# Patient Record
Sex: MALE | Race: WHITE | NOT HISPANIC OR LATINO | Employment: FULL TIME | URBAN - METROPOLITAN AREA
[De-identification: names, ages, dates, MRNs, and addresses within clinical notes are randomized per-mention and may not be internally consistent; named-entity substitution may affect disease eponyms.]

---

## 2023-08-09 ENCOUNTER — HOSPITAL ENCOUNTER (OUTPATIENT)
Dept: DATA CONVERSION | Facility: HOSPITAL | Age: 53
Discharge: HOME | End: 2023-08-09

## 2023-08-09 DIAGNOSIS — L03.116 CELLULITIS OF LEFT LOWER LIMB: ICD-10-CM

## 2023-08-09 DIAGNOSIS — F17.210 NICOTINE DEPENDENCE, CIGARETTES, UNCOMPLICATED: ICD-10-CM

## 2023-08-09 DIAGNOSIS — L53.9 ERYTHEMATOUS CONDITION, UNSPECIFIED: ICD-10-CM

## 2023-08-09 DIAGNOSIS — Z88.0 ALLERGY STATUS TO PENICILLIN: ICD-10-CM

## 2023-08-09 LAB
ALBUMIN SERPL-MCNC: 3.8 GM/DL (ref 3.5–5)
ALBUMIN/GLOB SERPL: 0.8 RATIO (ref 1.5–3)
ALP BLD-CCNC: 81 U/L (ref 35–125)
ALT SERPL-CCNC: 24 U/L (ref 5–40)
ALT SERPL-CCNC: ABNORMAL U/L (ref 5–40)
ANION GAP SERPL CALCULATED.3IONS-SCNC: 15 MMOL/L (ref 0–19)
AST SERPL-CCNC: 24 U/L (ref 5–40)
AST SERPL-CCNC: ABNORMAL U/L (ref 5–40)
BASOPHILS # BLD AUTO: 0.09 K/UL (ref 0–0.22)
BASOPHILS NFR BLD AUTO: 0.8 % (ref 0–1)
BILIRUB SERPL-MCNC: 0.8 MG/DL (ref 0.1–1.2)
BUN SERPL-MCNC: 14 MG/DL (ref 8–25)
BUN/CREAT SERPL: 10.8 RATIO (ref 8–21)
CALCIUM SERPL-MCNC: 9.3 MG/DL (ref 8.5–10.4)
CHLORIDE SERPL-SCNC: 99 MMOL/L (ref 97–107)
CO2 SERPL-SCNC: 20 MMOL/L (ref 24–31)
CREAT SERPL-MCNC: 1.3 MG/DL (ref 0.4–1.6)
DEPRECATED RDW RBC AUTO: 43.6 FL (ref 37–54)
DIFFERENTIAL METHOD BLD: ABNORMAL
EOSINOPHIL # BLD AUTO: 0.11 K/UL (ref 0–0.45)
EOSINOPHIL NFR BLD: 0.9 % (ref 0–3)
ERYTHROCYTE [DISTWIDTH] IN BLOOD BY AUTOMATED COUNT: 13.1 % (ref 11.7–15)
GFR SERPL CREATININE-BSD FRML MDRD: 65 ML/MIN/1.73 M2
GLOBULIN SER-MCNC: 4.5 G/DL (ref 1.9–3.7)
GLUCOSE SERPL-MCNC: 106 MG/DL (ref 65–99)
HCT VFR BLD AUTO: 49.8 % (ref 41–50)
HGB BLD-MCNC: 17.4 GM/DL (ref 13.5–16.5)
IMM GRANULOCYTES # BLD AUTO: 0.11 K/UL (ref 0–0.1)
LYMPHOCYTES # BLD AUTO: 1.05 K/UL (ref 1.2–3.2)
LYMPHOCYTES NFR BLD MANUAL: 8.8 % (ref 20–40)
MCH RBC QN AUTO: 31.7 PG (ref 26–34)
MCHC RBC AUTO-ENTMCNC: 34.9 % (ref 31–37)
MCV RBC AUTO: 90.7 FL (ref 80–100)
MONOCYTES # BLD AUTO: 1.04 K/UL (ref 0–0.8)
MONOCYTES NFR BLD MANUAL: 8.7 % (ref 0–8)
NEUTROPHILS # BLD AUTO: 9.58 K/UL
NEUTROPHILS # BLD AUTO: 9.58 K/UL (ref 1.8–7.7)
NEUTROPHILS.IMMATURE NFR BLD: 0.9 % (ref 0–1)
NEUTS SEG NFR BLD: 79.9 % (ref 50–70)
NRBC BLD-RTO: 0 /100 WBC
PLATELET # BLD AUTO: 196 K/UL (ref 150–450)
PMV BLD AUTO: 9.3 CU (ref 7–12.6)
POTASSIUM SERPL-SCNC: 3.9 MMOL/L (ref 3.4–5.1)
POTASSIUM SERPL-SCNC: ABNORMAL MMOL/L (ref 3.4–5.1)
PROT SERPL-MCNC: 8.3 G/DL (ref 5.9–7.9)
RBC # BLD AUTO: 5.49 M/UL (ref 4.5–5.5)
SODIUM SERPL-SCNC: 134 MMOL/L (ref 133–145)
WBC # BLD AUTO: 12 K/UL (ref 4.5–11)

## 2024-01-04 DIAGNOSIS — E78.49 OTHER HYPERLIPIDEMIA: Primary | ICD-10-CM

## 2024-01-04 RX ORDER — ATORVASTATIN CALCIUM 40 MG/1
40 TABLET, FILM COATED ORAL DAILY
Qty: 90 TABLET | Refills: 4 | Status: SHIPPED | OUTPATIENT
Start: 2024-01-04 | End: 2024-06-06 | Stop reason: SDUPTHER

## 2024-06-06 ENCOUNTER — OFFICE VISIT (OUTPATIENT)
Dept: PRIMARY CARE | Facility: CLINIC | Age: 54
End: 2024-06-06
Payer: COMMERCIAL

## 2024-06-06 VITALS
SYSTOLIC BLOOD PRESSURE: 144 MMHG | HEIGHT: 72 IN | BODY MASS INDEX: 42.66 KG/M2 | WEIGHT: 315 LBS | HEART RATE: 98 BPM | OXYGEN SATURATION: 98 % | DIASTOLIC BLOOD PRESSURE: 90 MMHG | TEMPERATURE: 97.9 F

## 2024-06-06 DIAGNOSIS — L03.116 CELLULITIS OF LEFT LOWER EXTREMITY: Primary | ICD-10-CM

## 2024-06-06 DIAGNOSIS — E78.49 OTHER HYPERLIPIDEMIA: ICD-10-CM

## 2024-06-06 PROCEDURE — 99214 OFFICE O/P EST MOD 30 MIN: CPT | Performed by: REGISTERED NURSE

## 2024-06-06 PROCEDURE — 4004F PT TOBACCO SCREEN RCVD TLK: CPT | Performed by: REGISTERED NURSE

## 2024-06-06 RX ORDER — SULFAMETHOXAZOLE AND TRIMETHOPRIM 800; 160 MG/1; MG/1
1 TABLET ORAL 2 TIMES DAILY
Qty: 14 TABLET | Refills: 0 | Status: SHIPPED | OUTPATIENT
Start: 2024-06-06 | End: 2024-06-13

## 2024-06-06 RX ORDER — ATORVASTATIN CALCIUM 40 MG/1
40 TABLET, FILM COATED ORAL DAILY
Qty: 90 TABLET | Refills: 3 | Status: SHIPPED | OUTPATIENT
Start: 2024-06-06 | End: 2024-09-04

## 2024-06-06 RX ORDER — CLOBETASOL PROPIONATE 0.5 MG/G
1 CREAM TOPICAL EVERY 12 HOURS
Qty: 60 G | Refills: 0 | Status: SHIPPED | OUTPATIENT
Start: 2024-06-06 | End: 2024-06-20

## 2024-06-06 RX ORDER — CLOBETASOL PROPIONATE 0.5 MG/G
1 CREAM TOPICAL EVERY 12 HOURS
COMMUNITY
End: 2024-06-06 | Stop reason: SDUPTHER

## 2024-06-06 ASSESSMENT — COLUMBIA-SUICIDE SEVERITY RATING SCALE - C-SSRS
2. HAVE YOU ACTUALLY HAD ANY THOUGHTS OF KILLING YOURSELF?: NO
1. IN THE PAST MONTH, HAVE YOU WISHED YOU WERE DEAD OR WISHED YOU COULD GO TO SLEEP AND NOT WAKE UP?: NO
6. HAVE YOU EVER DONE ANYTHING, STARTED TO DO ANYTHING, OR PREPARED TO DO ANYTHING TO END YOUR LIFE?: NO

## 2024-06-06 ASSESSMENT — PATIENT HEALTH QUESTIONNAIRE - PHQ9
2. FEELING DOWN, DEPRESSED OR HOPELESS: NOT AT ALL
1. LITTLE INTEREST OR PLEASURE IN DOING THINGS: NOT AT ALL
SUM OF ALL RESPONSES TO PHQ9 QUESTIONS 1 AND 2: 0

## 2024-06-06 ASSESSMENT — ENCOUNTER SYMPTOMS
WOUND: 1
DEPRESSION: 0
COLOR CHANGE: 1
OCCASIONAL FEELINGS OF UNSTEADINESS: 0
LOSS OF SENSATION IN FEET: 0

## 2024-06-06 ASSESSMENT — PAIN SCALES - GENERAL: PAINLEVEL: 6

## 2024-06-06 NOTE — PROGRESS NOTES
Subjective   Patient ID: Jose Dowling is a 54 y.o. male who presents for Cellulitis (Right leg- started yesterday, no injury, hurting) and Medication Problem.    HPI   Pt reports he woke yesterday with multiple joints having pain.  Today, the joint pain is gone but now he has cellulitis to the left shin  Review of Systems   Skin:  Positive for color change and wound.   All other systems reviewed and are negative.      Objective   /90 (BP Location: Right arm, Patient Position: Sitting, BP Cuff Size: Adult)   Pulse 98   Temp 36.6 °C (97.9 °F) (Temporal)   Ht 1.829 m (6')   Wt (!) 175 kg (385 lb)   SpO2 98%   BMI 52.22 kg/m²     Physical Exam  Vitals reviewed.   Constitutional:       Appearance: Normal appearance.   Skin:     General: Skin is warm.      Findings: Erythema present.   Neurological:      Mental Status: He is alert.   Psychiatric:         Mood and Affect: Mood normal.         Behavior: Behavior normal.     Pt has cellulitis to front of left lower leg. At this time, there is no discharge or drainage, no current signs of external infection    Assessment/Plan   Problem List Items Addressed This Visit    None  Visit Diagnoses         Codes    Cellulitis of left lower extremity    -  Primary L03.116    Other hyperlipidemia     E78.49    Relevant Medications    clobetasol (Temovate) 0.05 % cream    atorvastatin (Lipitor) 40 mg tablet

## 2024-09-04 DIAGNOSIS — E78.49 OTHER HYPERLIPIDEMIA: ICD-10-CM

## 2024-09-05 RX ORDER — CLOBETASOL PROPIONATE 0.5 MG/G
CREAM TOPICAL
Qty: 60 G | Refills: 0 | Status: SHIPPED | OUTPATIENT
Start: 2024-09-05

## 2025-07-28 ENCOUNTER — OFFICE VISIT (OUTPATIENT)
Dept: URGENT CARE | Age: 55
End: 2025-07-28
Payer: COMMERCIAL

## 2025-07-28 VITALS
BODY MASS INDEX: 52.89 KG/M2 | DIASTOLIC BLOOD PRESSURE: 80 MMHG | TEMPERATURE: 98.2 F | HEART RATE: 76 BPM | SYSTOLIC BLOOD PRESSURE: 161 MMHG | RESPIRATION RATE: 18 BRPM | WEIGHT: 315 LBS | OXYGEN SATURATION: 97 %

## 2025-07-28 DIAGNOSIS — Z72.0 TOBACCO USE: ICD-10-CM

## 2025-07-28 DIAGNOSIS — L03.119 CELLULITIS OF UPPER EXTREMITY, UNSPECIFIED LATERALITY: Primary | ICD-10-CM

## 2025-07-28 PROCEDURE — 99203 OFFICE O/P NEW LOW 30 MIN: CPT

## 2025-07-28 RX ORDER — MUPIROCIN 20 MG/G
1 OINTMENT TOPICAL
Qty: 22 G | Refills: 0 | Status: SHIPPED | OUTPATIENT
Start: 2025-07-28 | End: 2025-08-07

## 2025-07-28 RX ORDER — DOXYCYCLINE 100 MG/1
100 CAPSULE ORAL 2 TIMES DAILY
Qty: 20 CAPSULE | Refills: 0 | Status: SHIPPED | OUTPATIENT
Start: 2025-07-28 | End: 2025-08-07

## 2025-07-28 NOTE — PATIENT INSTRUCTIONS
Take medications as prescribed.  Keep arms clean with mild soap and water.  Do not use any hydrogen peroxide or alcohol on your arms.  Maintain adequate hydration and nutrition.  If symptoms worsen, do not improve, or any other concerning or worrisome symptoms develop please return to the clinic or go to the emergency department.  Follow-up with PCP in 5 to 7 days for recheck.

## 2025-07-28 NOTE — PROGRESS NOTES
Subjective   Patient ID: Jose Dowling is a 55 y.o. male. They present today with a chief complaint of Rash (Pt states rash on bilat arms x 4 days. Itching, redness. Unknown where rash came from.).    History of Present Illness  55-year-old male with vitals blood pressure 161/80, temperature 98.2, heart rate 76 bpm, respirations 22, SpO2 97%, weight 390 pounds, presents with rash bilateral arms itching red, bumps, swelling erythema, mild tenderness worse on the left started Friday.  Has some excoriations on bilateral arms from scratching.  Unsure of what this rash came from.  States he does have history of cellulitis on his left leg 2-1/2 years ago and again 2 years ago.  Denies recent yardwork/plant exposure, bug bites, fleas at home, new meds or changes in medication dosing, any new lotions/creams/soaps.  Has allergy to penicillins.  States he takes atorvastatin and uses clobetasol cream on his penis.  Denies new rash anywhere else other than his arms.  States he feels hot but has no fever.  Denies current nausea or vomiting, sweats, chest pain or shortness of breath, abdominal pain, weakness or numbness in his extremities.  Denies history of diabetes.  Discussed patient with supervising physician and I will prescribe  doxycycline twice a day for 10 days and mupirocin ointment, follow-up with family doctor in 5 to 7 days and ER precautions if not improving in 48 hours or developing any new symptoms.  Patient agrees with plan.      Rash        Past Medical History  Allergies as of 07/28/2025 - Reviewed 07/28/2025   Allergen Reaction Noted    Penicillins Anaphylaxis, Hives, and Unknown 05/17/2010       Prescriptions Prior to Admission[1]     Medical History[2]    Surgical History[3]     reports that he has been smoking cigarettes. He has never used smokeless tobacco. He reports current alcohol use. He reports current drug use. Drug: Marijuana.    Review of Systems  Review of Systems   Skin:  Positive for rash.    All other systems reviewed and are negative.                                 Objective    Vitals:    07/28/25 1239   BP: 161/80   BP Location: Left arm   Patient Position: Sitting   BP Cuff Size: Large adult   Pulse: 76   Resp: 22   Temp: 36.8 °C (98.2 °F)   TempSrc: Oral   SpO2: 97%   Weight: (!) 177 kg (390 lb)     No LMP for male patient.    Physical Exam  Vitals reviewed.   Constitutional:       General: He is not in acute distress.     Appearance: Normal appearance. He is not ill-appearing, toxic-appearing or diaphoretic.   HENT:      Head: Normocephalic and atraumatic.      Nose: Nose normal.     Cardiovascular:      Rate and Rhythm: Normal rate and regular rhythm.   Pulmonary:      Effort: Pulmonary effort is normal. No respiratory distress.     Musculoskeletal:      Cervical back: Normal range of motion and neck supple.     Skin:     General: Skin is warm and dry.      Comments: Blanchable erythematous rash with some excoriations from scratching on bilateral upper extremities.  Mild tenderness with palpation.  No purulent discharge.  Radial pulses intact and symmetrical.     Neurological:      General: No focal deficit present.      Mental Status: He is alert and oriented to person, place, and time.     Psychiatric:         Mood and Affect: Mood normal.         Behavior: Behavior normal.         Procedures    Point of Care Test & Imaging Results from this visit  No results found for this visit on 07/28/25.   Imaging  No results found.    Cardiology, Vascular, and Other Imaging  No other imaging results found for the past 2 days      Diagnostic study results (if any) were reviewed by Britni Berger PA-C.    Assessment/Plan   Allergies, medications, history, and pertinent labs/EKGs/Imaging reviewed by Britni Berger PA-C.     Medical Decision Making  55-year-old male with vitals blood pressure 161/80, temperature 98.2, heart rate 76 bpm, respirations 22, SpO2 97%, weight 390 pounds, presents with  rash bilateral arms itching red, bumps, swelling erythema, mild tenderness worse on the left started Friday.  Has some excoriations on bilateral arms from scratching.  Unsure of what this rash came from.  States he does have history of cellulitis on his left leg 2-1/2 years ago and again 2 years ago.  Denies recent yardwork/plant exposure, bug bites, fleas at home, new meds or changes in medication dosing, any new lotions/creams/soaps.  Has allergy to penicillins.  States he takes atorvastatin and uses clobetasol cream on his penis.  Denies new rash anywhere else other than his arms.  States he feels hot but has no fever.  Denies current nausea or vomiting, sweats, chest pain or shortness of breath, abdominal pain, weakness or numbness in his extremities.  Denies history of diabetes.  Discussed patient with supervising physician and I will prescribe  doxycycline twice a day for 10 days and mupirocin ointment, follow-up with family doctor in 5 to 7 days and ER precautions if not improving in 48 hours or developing any new symptoms.  Patient agrees with plan.    Orders and Diagnoses  There are no diagnoses linked to this encounter.    Medical Admin Record      Patient disposition: Home    Electronically signed by Britni Berger PA-C  12:49 PM           [1] (Not in a hospital admission)  [2] No past medical history on file.  [3] No past surgical history on file.